# Patient Record
Sex: MALE | Race: AMERICAN INDIAN OR ALASKA NATIVE | NOT HISPANIC OR LATINO | Employment: OTHER | URBAN - METROPOLITAN AREA
[De-identification: names, ages, dates, MRNs, and addresses within clinical notes are randomized per-mention and may not be internally consistent; named-entity substitution may affect disease eponyms.]

---

## 2017-01-09 PROBLEM — N40.1 BPH (BENIGN PROSTATIC HYPERTROPHY) WITH URINARY OBSTRUCTION: Status: ACTIVE | Noted: 2017-01-09

## 2017-01-09 PROBLEM — N13.8 BPH (BENIGN PROSTATIC HYPERTROPHY) WITH URINARY OBSTRUCTION: Status: ACTIVE | Noted: 2017-01-09

## 2017-03-31 PROBLEM — N17.9 AKI (ACUTE KIDNEY INJURY): Status: ACTIVE | Noted: 2017-03-31

## 2017-03-31 PROBLEM — R55 SYNCOPE: Status: ACTIVE | Noted: 2017-03-31

## 2017-03-31 PROBLEM — B18.2 CHRONIC HEPATITIS C WITHOUT HEPATIC COMA: Status: ACTIVE | Noted: 2017-03-31

## 2017-05-16 PROBLEM — R55 SYNCOPE: Status: RESOLVED | Noted: 2017-03-31 | Resolved: 2017-05-16

## 2017-05-16 PROBLEM — N17.9 AKI (ACUTE KIDNEY INJURY): Status: RESOLVED | Noted: 2017-03-31 | Resolved: 2017-05-16

## 2017-06-14 PROBLEM — I34.0 NON-RHEUMATIC MITRAL REGURGITATION: Status: ACTIVE | Noted: 2017-06-14

## 2017-06-14 PROBLEM — I42.0 DCM (DILATED CARDIOMYOPATHY): Status: ACTIVE | Noted: 2017-06-14

## 2017-06-14 PROBLEM — Z91.199 NONCOMPLIANCE: Status: ACTIVE | Noted: 2017-06-14

## 2017-06-14 PROBLEM — B18.2 CHRONIC HEPATITIS C WITHOUT HEPATIC COMA: Status: RESOLVED | Noted: 2017-03-31 | Resolved: 2017-06-14

## 2019-12-17 PROBLEM — C34.91 ADENOCARCINOMA, LUNG, RIGHT: Status: ACTIVE | Noted: 2019-12-17

## 2020-03-09 PROBLEM — C34.90 PRIMARY LUNG ADENOCARCINOMA: Status: ACTIVE | Noted: 2020-03-09

## 2020-06-01 ENCOUNTER — PATIENT OUTREACH (OUTPATIENT)
Dept: ADMINISTRATIVE | Facility: HOSPITAL | Age: 65
End: 2020-06-01

## 2020-09-15 ENCOUNTER — OFFICE VISIT (OUTPATIENT)
Dept: NEUROLOGY | Facility: CLINIC | Age: 65
End: 2020-09-15
Payer: MEDICARE

## 2020-09-15 VITALS
SYSTOLIC BLOOD PRESSURE: 126 MMHG | TEMPERATURE: 97 F | RESPIRATION RATE: 18 BRPM | DIASTOLIC BLOOD PRESSURE: 74 MMHG | HEIGHT: 67 IN | WEIGHT: 174.19 LBS | BODY MASS INDEX: 27.34 KG/M2 | HEART RATE: 84 BPM

## 2020-09-15 DIAGNOSIS — R93.89 ABNORMAL CT SCAN: ICD-10-CM

## 2020-09-15 DIAGNOSIS — K20.80 RADIATION-INDUCED ESOPHAGITIS: ICD-10-CM

## 2020-09-15 DIAGNOSIS — R93.0 ABNORMAL CT SCAN OF HEAD: Primary | ICD-10-CM

## 2020-09-15 DIAGNOSIS — T66.XXXA RADIATION-INDUCED ESOPHAGITIS: ICD-10-CM

## 2020-09-15 DIAGNOSIS — R56.9 FOCAL SEIZURES: Primary | ICD-10-CM

## 2020-09-15 PROCEDURE — 99999 PR PBB SHADOW E&M-EST. PATIENT-LVL IV: CPT | Mod: PBBFAC,,, | Performed by: PHYSICIAN ASSISTANT

## 2020-09-15 PROCEDURE — 99999 PR STA SHADOW: CPT | Mod: PBBFAC,,, | Performed by: PHYSICIAN ASSISTANT

## 2020-09-15 PROCEDURE — 99999 PR PBB SHADOW E&M-EST. PATIENT-LVL IV: ICD-10-PCS | Mod: PBBFAC,,, | Performed by: PHYSICIAN ASSISTANT

## 2020-09-15 PROCEDURE — 99214 OFFICE O/P EST MOD 30 MIN: CPT | Mod: PBBFAC | Performed by: PHYSICIAN ASSISTANT

## 2020-09-15 PROCEDURE — 99204 OFFICE O/P NEW MOD 45 MIN: CPT | Mod: S$PBB | Performed by: PHYSICIAN ASSISTANT

## 2020-09-15 RX ORDER — LEVETIRACETAM 100 MG/ML
SOLUTION ORAL
Qty: 948 ML | Refills: 6 | Status: ON HOLD | OUTPATIENT
Start: 2020-09-15 | End: 2020-12-24 | Stop reason: SDUPTHER

## 2020-09-15 NOTE — LETTER
September 15, 2020      Vinay Junior MD  [address]  [facility] [address]           [facility]  [address] [facility] [address]  Phone: [phone]  Fax: [phone]          Patient: Emeterio Kirby Jr.   MR Number: [id number]   YOB: 1955   Date of Visit: 9/15/2020       Dear Dr. Vinay Junior:    Thank you for referring Emeterio iKrby to me for evaluation. Attached you will find relevant portions of my assessment and plan of care.    If you have questions, please do not hesitate to call me. I look forward to following Emeterio Kirby along with you.    Sincerely,    SILVANO Eugene    Enclosure  CC:  No Recipients    If you would like to receive this communication electronically, please contact [email or web address] or [phone] to request more information on [employer] Link access.    For providers and/or their staff who would like to refer a patient to [facility], please contact us through our one-stop-shop provider referral line, [facility], at [phone].    If you feel you have received this communication in error or would no longer like to receive these types of communications, please e-mail [email or web address]

## 2020-09-15 NOTE — PROGRESS NOTES
Subjective:       Patient ID: Emeterio Kirby Jr. is a 65 y.o. male.    Chief Complaint: Seizures    HPI  Seizure Disorder  Emeterio Kirby Jr. is a 65 y.o. male is here for first evaluation for seizures.  First seizure - 4 months ago  Last seizure - 2 days ago  Average frequency per month -about 12 times per month, always in the morning, sometimes back to back.  Birth or developmental abnormality - No  Head injury or brain injury - No  Alcohol or drug abuse history - No  Family history of seizure - No  Medications intolerance/ineffective -Keppra 500 mg BID started last week. He is somnolent. He has difficulty swallowing due to radiation esophagitis. Needs liquid if possible.  Compliant with medications - yes  Aura - cannot remember. Wife states he has a look and grabs on to counter or furniture. She has sved him from falling several times.  Description of seizure - stares off with eyes wide open. He trembles some. He becomes very tonic as well. Last about 1-2 minutes. Urinary incontinence. No tongue biting. Confused and somnolent after.    Diagnosed with adenocarcinoma of the lung in November of 2019. He underwent chemo and radiation. He has completed therapy, and was  He is followed by Dr. Vinay Quesada at [facility].  CT of head in June 2020, normal. Seizures started. Repeat CT done last week reveals likely metastatic lesion.     He was started on Keppra 500 mg BID last week. Somnolent but taking it. He had difficulty swallowing the tablets. He chokes and gags. He has esophagitis due to radiation.    Past Medical History:   Diagnosis Date    Anxiety     Arthritis     Atherosclerosis of renal artery     Cancer     lung     Cannabis abuse, unspecified     Cardiac dysrhythmia, unspecified     Chronic hepatitis C virus infection 2/27/2015    Congestive heart failure, unspecified     Coronary artery disease, non-occlusive 05/05/2016    per Cath    Defibrillator discharge     Depression     Diastolic  dysfunction with chronic heart failure 05/05/2016    LVEDP on cath = 25    Hx of radiation therapy 02/13/2020    current therapy to chest    Hyperlipidemia     Hypertension     Lumbago     Neuralgia, neuritis, and radiculitis, unspecified     PAD (peripheral artery disease)     Parapsoriasis     Spinal stenosis in cervical region        Past Surgical History:   Procedure Laterality Date    AICD BATTERY REPLACEMENT  2016    BRONCHOSCOPY Right 1/8/2020    Procedure: BRONCHOSCOPY ;  Surgeon: Chantale Eastman MD;  Location: [facility];  Service: Pulmonary;  Laterality: Right;    CARDIAC CATHETERIZATION Left 5/05/2016; 3/30/2015    CARDIAC DEFIBRILLATOR PLACEMENT Left     [employer]    COLONOSCOPY      COLONOSCOPY N/A 5/3/2016    Procedure: COLONOSCOPY;  Surgeon: Ivan Vigil MD;  Location: [facility] ENDO;  Service: Endoscopy;  Laterality: N/A;    ENDOBRONCHIAL ULTRASOUND N/A 1/8/2020    Procedure: ENDOBRONCHIAL ULTRASOUND (EBUS);  Surgeon: Chantale Eastman MD;  Location: [facility] OR;  Service: Pulmonary;  Laterality: N/A;    ESOPHAGOGASTRODUODENOSCOPY      INSERTION OF TUNNELED CENTRAL VENOUS CATHETER (CVC) WITH SUBCUTANEOUS PORT Right 2/14/2020    Procedure: ANWDCIHSL-[id number]-F-CATH;  Surgeon: Alfredo Gannon MD;  Location: [facility] OR;  Service: General;  Laterality: Right;    LUNG BIOPSY Right 12/11/2019    Procedure: BIOPSY-LUNG;  Surgeon: Erik Diaz MD;  Location: [facility] CATH LAB;  Service: Radiology;  Laterality: Right;    PROSTATE BIOPSY      SKIN BIOPSY      SPINE SURGERY  2007    lumbar    TRANSURETHRAL RESECTION OF PROSTATE  01/09/2017    under spinal       Family History   Problem Relation Age of Onset    Diabetes Mother     Heart disease Father     No Known Problems Sister     No Known Problems Brother     Colon cancer Paternal Uncle     No Known Problems Paternal Grandmother     No Known Problems Paternal Grandfather     No Known Problems Maternal Grandmother     No  Known Problems Maternal Grandfather     Hemochromatosis Neg Hx     Liver cancer Neg Hx     Stomach cancer Neg Hx        Social History     Socioeconomic History    Marital status:      Spouse name: Not on file    Number of children: 1    Years of education: 5    Highest education level: Not on file   Occupational History    Occupation: disabled   Social Needs    Financial resource strain: Not on file    Food insecurity     Worry: Not on file     Inability: Not on file    Transportation needs     Medical: Not on file     Non-medical: Not on file   Tobacco Use    Smoking status: Former Smoker     Packs/day: 0.50     Years: 42.00     Pack years: 21.00     Types: Cigarettes     Quit date: 2020     Years since quittin.6    Smokeless tobacco: Never Used    Tobacco comment: quit 4 months ago   Substance and Sexual Activity    Alcohol use: No     Alcohol/week: 0.0 standard drinks     Comment: quit in 2015    Drug use: Not Currently     Types: Marijuana     Comment: last use 2 days ago    Sexual activity: Not Currently     Partners: Female   Lifestyle    Physical activity     Days per week: Not on file     Minutes per session: Not on file    Stress: Not on file   Relationships    Social connections     Talks on phone: Not on file     Gets together: Not on file     Attends [employer] service: Not on file     Active member of club or organization: Not on file     Attends meetings of clubs or organizations: Not on file     Relationship status: Not on file   Other Topics Concern    Not on file   Social History Narrative    Not on file       Current Outpatient Medications   Medication Sig Dispense Refill    aspirin (ECOTRIN) 81 MG EC tablet Take 81 mg by mouth once daily.      atorvastatin (LIPITOR) 10 MG tablet TAKE 1 TABLET BY MOUTH IN THE EVENING 90 tablet 3    carvediloL (COREG) 12.5 MG tablet TAKE 1 TABLET BY MOUTH TWICE DAILY WITH MEALS 180 tablet 3    FLUoxetine 40 MG capsule  Take 40 mg by mouth once daily.      furosemide (LASIX) 40 MG tablet Take 1 tablet (40 mg total) by mouth once daily. 60 tablet 5    gabapentin (NEURONTIN) 600 MG tablet Take 1 tablet (600 mg total) by mouth 3 (three) times daily. 270 tablet 3    hydrOXYzine HCl (ATARAX) 25 MG tablet Take 25 mg by mouth 3 (three) times daily as needed for Itching.      nitroGLYCERIN (NITROSTAT) 0.4 MG SL tablet Place 1 tablet (0.4 mg total) under the tongue every 5 (five) minutes as needed for Chest pain. 30 tablet 3    ondansetron (ZOFRAN-ODT) 8 MG TbDL Take 1 tablet (8 mg total) by mouth every 6 (six) hours as needed. 30 tablet 1    oxybutynin (DITROPAN-XL) 10 MG 24 hr tablet Take 1 tablet (10 mg total) by mouth once daily. 30 tablet 11    pantoprazole (PROTONIX) 40 MG tablet Take 1 tablet (40 mg total) by mouth 2 (two) times daily. 90 tablet 3    prochlorperazine (COMPAZINE) 10 MG tablet TAKE 1 TABLET BY MOUTH EVERY 6 HOURS WHILE AWAKE 10 tablet 0    spironolactone (ALDACTONE) 50 MG tablet       sucralfate (CARAFATE) 100 mg/mL suspension Take 10 mLs (1 g total) by mouth 4 (four) times daily. 1 Bottle 3    tiZANidine (ZANAFLEX) 4 MG tablet TAKE 1 TABLET BY MOUTH EVERY 8 HOURS AS NEEDED FOR MUSCLE SPASM 90 tablet 5    traZODone (DESYREL) 50 MG tablet Take 1 tablet (50 mg total) by mouth every evening. 30 tablet 11    triamcinolone acetonide 0.1% (KENALOG) 0.1 % cream APPLY  CREAM EXTERNALLY TO AFFECTED AREA TWICE DAILY FOR  TWO  WEEKS,  THEN  AS  NEEDED.  DO  NOT  APPLY  TO  FACE  OR  GROIN 1 Tube 0    vitamin D (VITAMIN D3) 1000 units Tab Take 1,000 Units by mouth once daily.      levETIRAcetam (KEPPRA) 100 mg/mL Soln 2 tsp (10 ml) twice a day for seizures 948 mL 6    megestroL (MEGACE) 40 MG Tab Take 1 tablet by mouth 4 times daily (Patient not taking: Reported on 9/15/2020) 120 tablet 3    protein (BOOST HIGH PROTEIN) Powd Take 275 g by mouth 3 (three) times daily. (Patient not taking: Reported on 9/15/2020)  0      No current facility-administered medications for this visit.        Review of patient's allergies indicates:  No Known Allergies    Review of Systems   Constitutional: Positive for activity change and appetite change. Negative for fever.   HENT: Negative for sore throat.    Eyes: Negative for visual disturbance.   Respiratory: Negative for cough and shortness of breath.    Cardiovascular: Negative for chest pain.   Gastrointestinal: Negative for nausea and vomiting.   Endocrine: Negative for cold intolerance and heat intolerance.   Genitourinary: Negative for difficulty urinating.   Musculoskeletal: Negative for arthralgias, back pain and neck pain.   Skin: Negative for rash.   Allergic/Immunologic: Negative for food allergies.   Neurological: Positive for seizures. Negative for dizziness, tremors, speech difficulty, weakness, numbness and headaches.   Hematological: Does not bruise/bleed easily.   Psychiatric/Behavioral: Negative for agitation, decreased concentration and sleep disturbance.       Objective:      Neurologic Exam     Mental Status   Oriented to person, place, and time.     Cranial Nerves     CN III, IV, VI   Pupils are equal, round, and reactive to light.    Gait, Coordination, and Reflexes     Gait  Gait: normal    Reflexes   Right brachioradialis: 1+  Left brachioradialis: 1+  Right biceps: 1+  Left biceps: 1+  Right triceps: 1+  Left triceps: 1+  Right patellar: 1+  Left patellar: 1+  Right achilles: 0  Left achilles: 0    Physical Exam  Constitutional:       General: He is not in acute distress.     Appearance: Normal appearance. He is well-developed. He is not diaphoretic.   HENT:      Head: Normocephalic and atraumatic.      Right Ear: External ear normal.      Left Ear: External ear normal.      Nose: Nose normal.   Eyes:      General: No scleral icterus.        Right eye: No discharge.         Left eye: No discharge.      Extraocular Movements: Extraocular movements intact.      Pupils:  Pupils are equal, round, and reactive to light.      Comments: No disc swelling. Normal vasculature     Neck:      Musculoskeletal: Normal range of motion and neck supple.   Cardiovascular:      Rate and Rhythm: Normal rate and regular rhythm.      Heart sounds: Normal heart sounds.   Pulmonary:      Effort: Pulmonary effort is normal.      Breath sounds: Normal breath sounds.   Abdominal:      General: Bowel sounds are normal.      Palpations: Abdomen is soft.   Musculoskeletal: Normal range of motion.         General: No swelling or tenderness.   Skin:     General: Skin is warm and dry.   Neurological:      General: No focal deficit present.      Mental Status: He is alert and oriented to person, place, and time.      Cranial Nerves: Cranial nerves are intact. No cranial nerve deficit.      Sensory: Sensation is intact.      Motor: Motor function is intact. No abnormal muscle tone.      Coordination: Coordination is intact. Coordination normal.      Gait: Gait is intact.      Deep Tendon Reflexes:      Reflex Scores:       Tricep reflexes are 1+ on the right side and 1+ on the left side.       Bicep reflexes are 1+ on the right side and 1+ on the left side.       Brachioradialis reflexes are 1+ on the right side and 1+ on the left side.       Patellar reflexes are 1+ on the right side and 1+ on the left side.       Achilles reflexes are 0 on the right side and 0 on the left side.  Psychiatric:         Behavior: Behavior normal.         Thought Content: Thought content normal.         CT head 09/09/2020  demonstrated mild to moderate atrophy.  Subtle hypodensities are again noted in white matter which may reflect mild underlying microvascular ischemic change.     Now evident is lateral right frontal subcortical small area hypodensity with question of associated 3-4 mm nodular focus of enhancement which in light patient's known primary lung cancer raises possibility metastatic disease.  No other definite vasogenic  edema or enhancing parenchymal lesion is seen.     Paranasal sinuses and mastoid air cells are clear.     Visualized paranasal sinuses and mastoid air cells are clear.     Impression:     Small area subcortical hypodensity possibly reflecting vasogenic edema lateral right frontal lobe with small, 3-4 mm subtle nodular focus of enhancement questioned.  In light of patient's known primary lung cancer the possibility of metastatic disease to brain is raised.  MRI of brain without and with gadolinium to better assess.     Mild to moderate atrophy and subtle nonspecific hypodensities in cerebral white matter which may reflect underlying mild microvascular ischemic change as previously seen.     This report was flagged in Epic as abnormal.        Electronically signed by: Nicole Parry MD  Date:                                            09/09/2020  Time:                                           16:06  Assessment:       1. Focal seizures    2. Radiation-induced esophagitis    3. Abnormal CT scan        Plan:   Focal seizures  -     levETIRAcetam (KEPPRA) 100 mg/mL Soln; 2 tsp (10 ml) twice a day for seizures  Dispense: 948 mL; Refill: 6  Advised to call here if seizures continue despite medication increase.  Follow up in 2 months.    Radiation-induced esophagitis  -     levETIRAcetam (KEPPRA) 100 mg/mL Soln; 2 tsp (10 ml) twice a day for seizures  Dispense: 948 mL; Refill: 6    Abnormal CT scan  Suspect metastatic lesion. Repeat CT scheduled in October.  No edema found.  Cannot have MRI due to defibrillator.  He is scheduled for follow up with radiation oncology at [facility].    We discussed occupational risks and hazards, driving restrictions and limitations, and general safety in patients with epilepsy and patients with episodes of loss of consciousness from any etiology. I specifically pointed out how  patients can put themselves and others at serious risks (leading to death and/or injury) if they have a  seizure (or episode of loss of consciousness)  while driving. Patient verbalized understanding.    Lillian Kenyon, PA

## 2020-09-17 ENCOUNTER — TELEPHONE (OUTPATIENT)
Dept: NEUROLOGY | Facility: CLINIC | Age: 65
End: 2020-09-17

## 2020-09-17 DIAGNOSIS — C79.31 METASTATIC ADENOCARCINOMA TO BRAIN: ICD-10-CM

## 2020-09-17 DIAGNOSIS — R56.9 FOCAL SEIZURES: Primary | ICD-10-CM

## 2020-09-17 RX ORDER — CARBAMAZEPINE 200 MG/1
CAPSULE, EXTENDED RELEASE ORAL
Qty: 120 CAPSULE | Refills: 6 | Status: SHIPPED | OUTPATIENT
Start: 2020-09-17

## 2020-09-17 NOTE — TELEPHONE ENCOUNTER
He may stop Keppra. Start carbamazepine 200 mg (one capsule) twice a day for 1 week then 2 capsules (400 mg)  twice a day.

## 2020-09-17 NOTE — TELEPHONE ENCOUNTER
----- Message from Katelyn Sy MA sent at 2020  2:02 PM CDT -----  Contact: Wife-- Katie Kirby Jr.  MRN: [id number]  : 1955  PCP: Tom Encarnacion  Home Phone      [phone]  Work Phone      Not on file.  Mobile          [phone]  Home Phone      [phone]  Mobile          [phone]      MESSAGE:  Is asking for a return call in reference to patient's medication.         Phone: [phone]

## 2020-09-17 NOTE — TELEPHONE ENCOUNTER
Spoke with wife, instructed her to stop the pts Keppra (she takes care of pts medicines). Informed her that new medication has already been sent to pharmacy. Explained instructions of new medication and instructed her to contact office if she has any questions or concerns. She gave verbal understanding.

## 2020-09-17 NOTE — TELEPHONE ENCOUNTER
----- Message from Dorothy Sutton sent at 2020 12:39 PM CDT -----  Contact: SPOUSE - BETTY Kirby Jr.  MRN: [id number]  : 1955  PCP: Tom Encarnacion  Home Phone      [phone]  Work Phone      Not on file.  Mobile          [phone]  Home Phone      [phone]  Mobile          [phone]      MESSAGE: Wife states that the Keppra that Lillian prescribed for him is not working.  She states he has started having agitation & has become aggressive.  She would like to know if there is something else that Lillian can prescribe.        Phone: [phone]

## 2020-11-03 ENCOUNTER — TELEPHONE (OUTPATIENT)
Dept: NEUROLOGY | Facility: CLINIC | Age: 65
End: 2020-11-03

## 2020-11-03 DIAGNOSIS — G40.909 SEIZURE DISORDER: Primary | ICD-10-CM

## 2020-11-03 DIAGNOSIS — G93.6 VASOGENIC CEREBRAL EDEMA: Primary | ICD-10-CM

## 2020-11-03 RX ORDER — DEXAMETHASONE 2 MG/1
TABLET ORAL
Qty: 90 TABLET | Refills: 1 | Status: SHIPPED | OUTPATIENT
Start: 2020-11-03

## 2020-11-03 NOTE — PROGRESS NOTES
Patient with increased seizures. On tegretol 400 mg twice a day. ? Compliance. Dr Brannon, radiation oncologist called with update today. I ordered CBZ level. Recent CT shows increased metastatic lesions with some vasogenic edema right frontal. Decadron 2 mg TID sent to patient's pharmacy. Instructed to take with food. No driving. Sooner follow up appt.needed.

## 2020-11-10 ENCOUNTER — OFFICE VISIT (OUTPATIENT)
Dept: NEUROLOGY | Facility: CLINIC | Age: 65
End: 2020-11-10
Payer: MEDICARE

## 2020-11-10 VITALS
BODY MASS INDEX: 27.44 KG/M2 | HEIGHT: 67 IN | RESPIRATION RATE: 18 BRPM | OXYGEN SATURATION: 98 % | HEART RATE: 62 BPM | TEMPERATURE: 98 F | SYSTOLIC BLOOD PRESSURE: 122 MMHG | WEIGHT: 174.81 LBS | DIASTOLIC BLOOD PRESSURE: 70 MMHG

## 2020-11-10 DIAGNOSIS — G40.909 SEIZURE DISORDER: Primary | ICD-10-CM

## 2020-11-10 DIAGNOSIS — C79.31 METASTATIC CANCER TO BRAIN: ICD-10-CM

## 2020-11-10 PROCEDURE — 99999 PR PBB SHADOW E&M-EST. PATIENT-LVL IV: CPT | Mod: PBBFAC,,, | Performed by: PHYSICIAN ASSISTANT

## 2020-11-10 PROCEDURE — 99214 OFFICE O/P EST MOD 30 MIN: CPT | Mod: PBBFAC | Performed by: PHYSICIAN ASSISTANT

## 2020-11-10 PROCEDURE — 99999 PR STA SHADOW: CPT | Mod: PBBFAC,,, | Performed by: PHYSICIAN ASSISTANT

## 2020-11-10 PROCEDURE — 99999 PR STA SHADOW: ICD-10-PCS | Mod: PBBFAC,,, | Performed by: PHYSICIAN ASSISTANT

## 2020-11-10 PROCEDURE — 99214 OFFICE O/P EST MOD 30 MIN: CPT | Mod: S$PBB | Performed by: PHYSICIAN ASSISTANT

## 2020-11-10 NOTE — PROGRESS NOTES
Subjective:       Patient ID: Emeterio Kirby Jr. is a 65 y.o. male.    Chief Complaint: Seizures      HPI:  Emeterio Kirby Jr. is a 65 y.o. male is here for follow up visit.He has metastatic lung cancer with brain mets. He started with seizures a few months ago. He is here for follow up.  Medications and tolerability were reviewed and discussed in detail. He was intolerant of Keppra, somnolence. He was started on CBZ, but he did not like the sleepiness.  He continued with seizures. He saw Dr. Brannon, radiation oncologist last week. He had seizure. CBZ level drawn, and it was evident he had not been taking the medication although he reported taking it. Since Monday a week ago, he has been taking 400 mg BID. He is very quiet at home and sleeps often. Seizures continue. He had one Sunday and 2 today per wife. He is no longer driving, thankfully.    Recent brain imaging shows ring enhancing lesions with some vasogenic edema. He started decadron 2 mg TID for the edema. He is also taking Prilosec OTC for stomach protection. He seems to be tolerating the decadron well.    He will be having gamma knife surgery to the brain in the next few weeks for the mets.    Past Medical History:   Diagnosis Date    Anxiety     Arthritis     Atherosclerosis of renal artery     Cancer     lung     Cannabis abuse, unspecified     Cardiac dysrhythmia, unspecified     Chronic hepatitis C virus infection 2/27/2015    Congestive heart failure, unspecified     Coronary artery disease, non-occlusive 05/05/2016    per Cath    Defibrillator discharge     Depression     Diastolic dysfunction with chronic heart failure 05/05/2016    LVEDP on cath = 25    Hx of radiation therapy 02/13/2020    current therapy to chest    Hyperlipidemia     Hypertension     Lumbago     Neuralgia, neuritis, and radiculitis, unspecified     PAD (peripheral artery disease)     Parapsoriasis     Spinal stenosis in cervical region        Past  Surgical History:   Procedure Laterality Date    AICD BATTERY REPLACEMENT  2016    BRONCHOSCOPY Right 1/8/2020    Procedure: BRONCHOSCOPY ;  Surgeon: Chantale Eastman MD;  Location: [facility];  Service: Pulmonary;  Laterality: Right;    CARDIAC CATHETERIZATION Left 5/05/2016; 3/30/2015    CARDIAC DEFIBRILLATOR PLACEMENT Left     [employer]    COLONOSCOPY      COLONOSCOPY N/A 5/3/2016    Procedure: COLONOSCOPY;  Surgeon: Ivan Vigil MD;  Location: [facility] ENDO;  Service: Endoscopy;  Laterality: N/A;    ENDOBRONCHIAL ULTRASOUND N/A 1/8/2020    Procedure: ENDOBRONCHIAL ULTRASOUND (EBUS);  Surgeon: Chantale Eastman MD;  Location: [facility] OR;  Service: Pulmonary;  Laterality: N/A;    ESOPHAGOGASTRODUODENOSCOPY      INSERTION OF TUNNELED CENTRAL VENOUS CATHETER (CVC) WITH SUBCUTANEOUS PORT Right 2/14/2020    Procedure: [id number];  Surgeon: Alfredo Gannon MD;  Location: [facility];  Service: General;  Laterality: Right;    LUNG BIOPSY Right 12/11/2019    Procedure: BIOPSY-LUNG;  Surgeon: Erik Diaz MD;  Location: [facility] CATH LAB;  Service: Radiology;  Laterality: Right;    PROSTATE BIOPSY      SKIN BIOPSY      SPINE SURGERY  2007    lumbar    TRANSURETHRAL RESECTION OF PROSTATE  01/09/2017    under spinal       Family History   Problem Relation Age of Onset    Diabetes Mother     Heart disease Father     No Known Problems Sister     No Known Problems Brother     Colon cancer Paternal Uncle     No Known Problems Paternal Grandmother     No Known Problems Paternal Grandfather     No Known Problems Maternal Grandmother     No Known Problems Maternal Grandfather     Hemochromatosis Neg Hx     Liver cancer Neg Hx     Stomach cancer Neg Hx        Social History     Socioeconomic History    Marital status:      Spouse name: Not on file    Number of children: 1    Years of education: 5    Highest education level: Not on file   Occupational History    Occupation:  disabled   Social Needs    Financial resource strain: Not on file    Food insecurity     Worry: Not on file     Inability: Not on file    Transportation needs     Medical: Not on file     Non-medical: Not on file   Tobacco Use    Smoking status: Former Smoker     Packs/day: 0.50     Years: 42.00     Pack years: 21.00     Types: Cigarettes     Quit date: 2020     Years since quittin.7    Smokeless tobacco: Never Used    Tobacco comment: quit 4 months ago   Substance and Sexual Activity    Alcohol use: No     Alcohol/week: 0.0 standard drinks     Comment: quit in 2015    Drug use: Not Currently     Types: Marijuana     Comment: last use 2 days ago    Sexual activity: Not Currently     Partners: Female   Lifestyle    Physical activity     Days per week: Not on file     Minutes per session: Not on file    Stress: Not on file   Relationships    Social connections     Talks on phone: Not on file     Gets together: Not on file     Attends Orthodox service: Not on file     Active member of club or organization: Not on file     Attends meetings of clubs or organizations: Not on file     Relationship status: Not on file   Other Topics Concern    Not on file   Social History Narrative    Not on file       Current Outpatient Medications   Medication Sig Dispense Refill    aspirin (ECOTRIN) 81 MG EC tablet Take 81 mg by mouth once daily.      atorvastatin (LIPITOR) 10 MG tablet TAKE 1 TABLET BY MOUTH IN THE EVENING 90 tablet 3    carBAMazepine (CARBATROL) 200 MG CM12 One capsule twice a day for one week then 2 capsules twice a day for seizures. 120 capsule 6    carvediloL (COREG) 12.5 MG tablet TAKE 1 TABLET BY MOUTH TWICE DAILY WITH MEALS 180 tablet 3    dexAMETHasone (DECADRON) 2 MG tablet One tablet every 8 hours (3 times a day)  for swelling in the brain. Take with food. 90 tablet 1    FLUoxetine 40 MG capsule Take 40 mg by mouth once daily.      furosemide (LASIX) 40 MG tablet Take 1  tablet (40 mg total) by mouth once daily. 60 tablet 5    hydrOXYzine HCl (ATARAX) 25 MG tablet Take 25 mg by mouth 3 (three) times daily as needed for Itching.      levETIRAcetam (KEPPRA) 100 mg/mL Soln 2 tsp (10 ml) twice a day for seizures 948 mL 6    nitroGLYCERIN (NITROSTAT) 0.4 MG SL tablet Place 1 tablet (0.4 mg total) under the tongue every 5 (five) minutes as needed for Chest pain. 30 tablet 3    ondansetron (ZOFRAN-ODT) 8 MG TbDL Take 1 tablet (8 mg total) by mouth every 6 (six) hours as needed. 30 tablet 1    oxybutynin (DITROPAN-XL) 10 MG 24 hr tablet Take 1 tablet (10 mg total) by mouth once daily. 30 tablet 11    pantoprazole (PROTONIX) 40 MG tablet Take 1 tablet (40 mg total) by mouth 2 (two) times daily. 90 tablet 3    prochlorperazine (COMPAZINE) 10 MG tablet TAKE 1 TABLET BY MOUTH EVERY 6 HOURS WHILE AWAKE 10 tablet 0    spironolactone (ALDACTONE) 50 MG tablet       sucralfate (CARAFATE) 100 mg/mL suspension Take 10 mLs (1 g total) by mouth 4 (four) times daily. 1 Bottle 3    tiZANidine (ZANAFLEX) 4 MG tablet TAKE 1 TABLET BY MOUTH EVERY 8 HOURS AS NEEDED FOR MUSCLE SPASM 90 tablet 0    traZODone (DESYREL) 50 MG tablet Take 1 tablet (50 mg total) by mouth every evening. 30 tablet 11    triamcinolone acetonide 0.1% (KENALOG) 0.1 % cream APPLY  CREAM EXTERNALLY TO AFFECTED AREA TWICE DAILY FOR  TWO  WEEKS,  THEN  AS  NEEDED.  DO  NOT  APPLY  TO  FACE  OR  GROIN 1 Tube 0    vitamin D (VITAMIN D3) 1000 units Tab Take 1,000 Units by mouth once daily.      eslicarbazepine (APTIOM) 400 mg Tab tablet 2 tablets at night for one week then increase to 3 tablets at night for seizures. 90 tablet 2    gabapentin (NEURONTIN) 600 MG tablet Take 1 tablet (600 mg total) by mouth 3 (three) times daily. 270 tablet 3     No current facility-administered medications for this visit.        Review of patient's allergies indicates:  No Known Allergies    Review of Systems   Constitutional: Positive for  activity change, appetite change, fatigue and unexpected weight change. Negative for fever.   HENT: Negative for sore throat.    Eyes: Negative for visual disturbance.   Respiratory: Negative for cough and shortness of breath.    Cardiovascular: Negative for chest pain.   Gastrointestinal: Negative for nausea and vomiting.   Endocrine: Negative for cold intolerance and heat intolerance.   Genitourinary: Negative for difficulty urinating.   Musculoskeletal: Negative for arthralgias, back pain and neck pain.   Skin: Positive for rash (flare up of psoriasis).   Allergic/Immunologic: Negative for food allergies.   Neurological: Positive for seizures and headaches. Negative for dizziness, tremors, speech difficulty, weakness and numbness.        Intermittent left arm contractures, likely sz   Hematological: Does not bruise/bleed easily.   Psychiatric/Behavioral: Positive for dysphoric mood. Negative for agitation, decreased concentration and sleep disturbance.       Objective:      Neurologic Exam     Mental Status   Oriented to person, place, and time.     Cranial Nerves     CN III, IV, VI   Pupils are equal, round, and reactive to light.    Gait, Coordination, and Reflexes     Gait  Gait: normal    Reflexes   Right brachioradialis: 1+  Left brachioradialis: 1+  Right biceps: 1+  Left biceps: 1+  Right triceps: 1+  Left triceps: 1+  Right patellar: 1+  Left patellar: 1+  Right achilles: 0  Left achilles: 0    Physical Exam  Constitutional:       General: He is not in acute distress.     Appearance: Normal appearance. He is well-developed. He is not diaphoretic.   HENT:      Head: Normocephalic and atraumatic.      Right Ear: External ear normal.      Left Ear: External ear normal.      Nose: Nose normal.   Eyes:      General: No scleral icterus.        Right eye: No discharge.         Left eye: No discharge.      Extraocular Movements: Extraocular movements intact.      Pupils: Pupils are equal, round, and reactive to  light.      Comments: No disc swelling. Normal vasculature     Neck:      Musculoskeletal: Normal range of motion and neck supple.   Cardiovascular:      Rate and Rhythm: Normal rate and regular rhythm.      Heart sounds: Normal heart sounds.   Pulmonary:      Effort: Pulmonary effort is normal.      Breath sounds: Normal breath sounds.   Abdominal:      General: Bowel sounds are normal.      Palpations: Abdomen is soft.   Musculoskeletal: Normal range of motion.         General: No swelling or tenderness.   Skin:     General: Skin is warm and dry.   Neurological:      General: No focal deficit present.      Mental Status: He is alert and oriented to person, place, and time.      Cranial Nerves: Cranial nerves are intact. No cranial nerve deficit.      Sensory: Sensation is intact.      Motor: Motor function is intact. No abnormal muscle tone (no increased tone on exam).      Coordination: Coordination is intact. Coordination normal.      Gait: Gait is intact.      Deep Tendon Reflexes:      Reflex Scores:       Tricep reflexes are 1+ on the right side and 1+ on the left side.       Bicep reflexes are 1+ on the right side and 1+ on the left side.       Brachioradialis reflexes are 1+ on the right side and 1+ on the left side.       Patellar reflexes are 1+ on the right side and 1+ on the left side.       Achilles reflexes are 0 on the right side and 0 on the left side.  Psychiatric:         Behavior: Behavior normal.         Thought Content: Thought content normal.         Assessment:       1. Seizure disorder    2. Metastatic cancer to brain        Plan:   Discussed risks and benefits of potential treatment options at length as well as potential side effects of medication changes. After careful consideration,  current medications were changed. Please refer to medication reconciliation for details. Seizure precautions were discussed. Specifically discussed driving restrictions per [place] law. Medication  compliance discussed. Instructed to call clinic if concerned or to ED if emergency.  We discussed occupational risks and hazards, driving restrictions and limitations, and general safety in patients with epilepsy and patients with episodes of loss of consciousness from any etiology. I specifically pointed out how patients can put themselves and others at serious risks (leading to death and/or injury) if they have a seizure (or episode of loss of consciousness)  while driving. Patient verbalized understanding    Seizure disorder  -     eslicarbazepine (APTIOM) 400 mg Tab tablet; 2 tablets at night for one week then increase to 3 tablets at night for seizures.  Dispense: 90 tablet; Refill: 2  Not tolerating CBZ and not effective at 800 mg BID. Higher doses not likely tolerated.  Could not tolerate Keppra, somnolence and irritability    Metastatic cancer to brain  -     eslicarbazepine (APTIOM) 400 mg Tab tablet; 2 tablets at night for one week then increase to 3 tablets at night for seizures.  Dispense: 90 tablet; Refill: 2    instructed to start Aptiom and stop CBZ once he receives it. He will start at 800 mg since he has been on CBZ. It will be a direct change without need to wean CBZ.   Medication will likely need PA. Patient informed. Continue  mg BID until receiving Aptiom.    SILVANO Han

## 2020-11-11 ENCOUNTER — TELEPHONE (OUTPATIENT)
Dept: NEUROLOGY | Facility: CLINIC | Age: 65
End: 2020-11-11

## 2020-11-17 ENCOUNTER — TELEPHONE (OUTPATIENT)
Dept: NEUROLOGY | Facility: CLINIC | Age: 65
End: 2020-11-17

## 2020-11-17 NOTE — TELEPHONE ENCOUNTER
----- Message from Dorothy Sutton sent at 2020 11:55 AM CST -----  Contact: WIFE - BETTY Kirby Jr.  MRN: [id number]  : 1955  PCP: Tom Encarnacion  Home Phone      [phone]  Work Phone      Not on file.  Mobile          [phone]  Home Phone      [phone]  Mobile          [phone]      MESSAGE: Wife states that the patient is extremely weak and has a severe headache.  He would like to  what recommendations Lillian might have.        Phone: [phone]

## 2020-12-21 PROBLEM — R41.82 ALTERED MENTAL STATUS: Status: ACTIVE | Noted: 2020-12-21

## 2020-12-21 PROBLEM — A41.9 SEPSIS: Status: ACTIVE | Noted: 2020-12-21

## 2020-12-21 PROBLEM — R41.82 ALTERED MENTAL STATUS: Status: RESOLVED | Noted: 2020-12-21 | Resolved: 2020-12-21

## 2020-12-21 PROBLEM — R41.82 AMS (ALTERED MENTAL STATUS): Status: ACTIVE | Noted: 2020-12-21

## 2020-12-22 PROBLEM — R41.82 ALTERED MENTAL STATUS: Status: ACTIVE | Noted: 2020-12-22

## 2020-12-22 PROBLEM — R41.82 ALTERED MENTAL STATUS: Status: RESOLVED | Noted: 2020-12-22 | Resolved: 2020-12-22

## 2020-12-22 PROBLEM — R63.8 INADEQUATE ORAL INTAKE: Status: ACTIVE | Noted: 2020-12-22

## 2020-12-24 PROBLEM — R41.82 AMS (ALTERED MENTAL STATUS): Status: RESOLVED | Noted: 2020-12-21 | Resolved: 2020-12-24

## 2020-12-24 PROBLEM — E87.1 HYPONATREMIA: Status: ACTIVE | Noted: 2020-12-24

## 2020-12-24 PROBLEM — A41.9 SEPSIS: Status: RESOLVED | Noted: 2020-12-21 | Resolved: 2020-12-24

## 2020-12-24 PROBLEM — D64.9 ANEMIA: Status: ACTIVE | Noted: 2020-12-24
